# Patient Record
Sex: MALE | Race: BLACK OR AFRICAN AMERICAN | Employment: FULL TIME | URBAN - METROPOLITAN AREA
[De-identification: names, ages, dates, MRNs, and addresses within clinical notes are randomized per-mention and may not be internally consistent; named-entity substitution may affect disease eponyms.]

---

## 2021-05-16 ENCOUNTER — HOSPITAL ENCOUNTER (EMERGENCY)
Age: 46
Discharge: HOME | End: 2021-05-16
Attending: STUDENT IN AN ORGANIZED HEALTH CARE EDUCATION/TRAINING PROGRAM
Payer: COMMERCIAL

## 2021-05-16 VITALS
WEIGHT: 240 LBS | HEART RATE: 97 BPM | BODY MASS INDEX: 31.81 KG/M2 | TEMPERATURE: 97.8 F | SYSTOLIC BLOOD PRESSURE: 148 MMHG | RESPIRATION RATE: 18 BRPM | DIASTOLIC BLOOD PRESSURE: 98 MMHG | HEIGHT: 73 IN | OXYGEN SATURATION: 96 %

## 2021-05-16 DIAGNOSIS — Z23 NEED FOR TDAP VACCINATION: ICD-10-CM

## 2021-05-16 DIAGNOSIS — S01.01XA LACERATION OF SCALP, INITIAL ENCOUNTER: ICD-10-CM

## 2021-05-16 DIAGNOSIS — S09.90XA CLOSED HEAD INJURY, INITIAL ENCOUNTER: Primary | ICD-10-CM

## 2021-05-16 PROCEDURE — 90471 IMMUNIZATION ADMIN: CPT | Performed by: PHYSICIAN ASSISTANT

## 2021-05-16 PROCEDURE — 99283 EMERGENCY DEPT VISIT LOW MDM: CPT

## 2021-05-16 PROCEDURE — 6360000002 HC RX W HCPCS: Performed by: PHYSICIAN ASSISTANT

## 2021-05-16 PROCEDURE — 90715 TDAP VACCINE 7 YRS/> IM: CPT | Performed by: PHYSICIAN ASSISTANT

## 2021-05-16 PROCEDURE — 12001 RPR S/N/AX/GEN/TRNK 2.5CM/<: CPT

## 2021-05-16 RX ORDER — BACITRACIN ZINC AND POLYMYXIN B SULFATE 500; 1000 [USP'U]/G; [USP'U]/G
OINTMENT TOPICAL
Qty: 1 TUBE | Refills: 1 | Status: SHIPPED | OUTPATIENT
Start: 2021-05-16 | End: 2021-05-16 | Stop reason: SDUPTHER

## 2021-05-16 RX ORDER — BACITRACIN ZINC AND POLYMYXIN B SULFATE 500; 1000 [USP'U]/G; [USP'U]/G
OINTMENT TOPICAL
Qty: 1 TUBE | Refills: 1 | Status: SHIPPED | OUTPATIENT
Start: 2021-05-16 | End: 2021-05-23

## 2021-05-16 RX ADMIN — TETANUS TOXOID, REDUCED DIPHTHERIA TOXOID AND ACELLULAR PERTUSSIS VACCINE, ADSORBED 0.5 ML: 5; 2.5; 8; 8; 2.5 SUSPENSION INTRAMUSCULAR at 20:16

## 2021-05-16 ASSESSMENT — PAIN DESCRIPTION - FREQUENCY: FREQUENCY: CONTINUOUS

## 2021-05-16 ASSESSMENT — PAIN DESCRIPTION - PAIN TYPE: TYPE: ACUTE PAIN

## 2021-05-16 ASSESSMENT — PAIN DESCRIPTION - DESCRIPTORS: DESCRIPTORS: DISCOMFORT;DULL;THROBBING

## 2021-05-16 ASSESSMENT — PAIN DESCRIPTION - LOCATION: LOCATION: HEAD

## 2021-05-16 ASSESSMENT — PAIN SCALES - GENERAL: PAINLEVEL_OUTOF10: 3

## 2021-05-17 NOTE — ED NOTES
Discharged   To follow with pmd.  rx x 1 escribed to [facility]-   Also given  [facility]  (RT 46/pedro luis)     Grant Ramirez RN  05/16/21 2034
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
## 2021-05-25 ENCOUNTER — HOSPITAL ENCOUNTER (EMERGENCY)
Age: 46
Discharge: HOME | End: 2021-05-25
Attending: EMERGENCY MEDICINE
Payer: COMMERCIAL

## 2021-05-25 VITALS
RESPIRATION RATE: 16 BRPM | BODY MASS INDEX: 31.81 KG/M2 | SYSTOLIC BLOOD PRESSURE: 125 MMHG | HEART RATE: 86 BPM | TEMPERATURE: 97.8 F | HEIGHT: 73 IN | WEIGHT: 240 LBS | DIASTOLIC BLOOD PRESSURE: 70 MMHG | OXYGEN SATURATION: 97 %

## 2021-05-25 DIAGNOSIS — Z48.02 ENCOUNTER FOR STAPLE REMOVAL: Primary | ICD-10-CM

## 2021-05-25 PROCEDURE — 99282 EMERGENCY DEPT VISIT SF MDM: CPT

## 2021-05-25 NOTE — ED PROVIDER NOTES
HPI:  5/25/21, Time: 11:30 AM EDT         Stacey Hodgson is a 51 y.o. male presenting to the ED for staple removal today. 5 staples were placed in scalp last Sunday (9 days ago). Wound has healed. No pain. The complaint has been persistent, moderate in severity, and worsened by nothing. Patient denies any complaints. ROS:   A complete review of systems was performed and all pertinent positives and negatives are stated within HPI, all other systems reviewed and are negative.      --------------------------------------------- PAST HISTORY ---------------------------------------------  Past Medical History:  has no past medical history on file. Past Surgical History:  has no past surgical history on file. Social History:  reports that he has been smoking cigarettes. He has been smoking about 1.00 pack per day. He has never used smokeless tobacco. He reports current alcohol use. Family History: family history is not on file. The patients home medications have been reviewed. Allergies: Phenobarbital and Rondec-d [chlophedianol-pseudoephedrine]        ----------------------------------------PHYSICAL EXAM--------------------------------------  Constitutional:  Well developed, well nourished, no acute distress, non-toxic appearance   Eyes:  PERRL, conjunctiva normal, EOMI  HENT:  Atraumatic, healed laceration on top of scalp with 5 staples in place. external ears normal, nose normal, oropharynx moist. Neck- normal range of motion, no nuchal rigidity   Respiratory:  No respiratory distress  Cardiovascular:  Normal rate, normal rhythm. Radial pulses 2+ bilaterally. Musculoskeletal:  No edema, no tenderness, no deformities. Back- no tenderness  Integument:  Well hydrated, no visible rash. Adequate perfusion. Neurologic:  Alert & oriented x 3, CN 2-12 normal, no focal deficits noted. Normal gait.     Psychiatric:  Speech and behavior appropriate -------------------------------------------------- RESULTS -------------------------------------------------  I have personally reviewed all laboratory and imaging results for this patient. Results are listed below. LABS:  No results found for this visit on 05/25/21. RADIOLOGY:  Interpreted by Radiologist.  No orders to display     ------------------------- NURSING NOTES AND VITALS REVIEWED ---------------------------  The nursing notes within the ED encounter and vital signs as below have been reviewed by myself. /70   Pulse 86   Temp 97.8 °F (36.6 °C) (Infrared)   Resp 16   Ht 6' 1\" (1.854 m)   Wt 240 lb (108.9 kg)   SpO2 97%   BMI 31.66 kg/m²   Oxygen Saturation Interpretation: Normal      The patients available past medical records and past encounters were reviewed. ------------------------------ ED COURSE/MEDICAL DECISION MAKING----------------------  Medications - No data to display        Procedures:   PROCEDURE NOTE  5/25/21       Time: 11:30 AM EDT  SUTURE/STAPLE REMOVAL  Risks, benefits and alternatives (for applicable procedures below) described. Performed By: Rosa Gilmore DO. Indication:  Wound healed. Informed consent obtained: The patient provided verbal consent for this procedure. .  Location: scalp  Procedure:  5 staples were removed without difficulty. Complications:  None. Wound care instructions provided. Patient/guardian was instructed to be alert for any signs of cutaneous infection. Lexy Thomson DO am the Primary Provider of Record    Counseling: The emergency provider has spoken with the patient and discussed todays results, in addition to providing specific details for the plan of care and counseling regarding the diagnosis and prognosis. Questions are answered at this time and they are agreeable with the plan.    --------------------------- IMPRESSION AND DISPOSITION ---------------------------------    IMPRESSION  1.  Encounter

## 2023-08-24 ENCOUNTER — HOSPITAL ENCOUNTER (OUTPATIENT)
Age: 48
Discharge: HOME | End: 2023-08-26

## 2023-09-01 LAB — SURGICAL PATHOLOGY REPORT: NORMAL

## 2023-11-01 ENCOUNTER — HOSPITAL ENCOUNTER (OUTPATIENT)
Age: 48
Discharge: HOME | End: 2023-11-03

## 2023-11-01 LAB
ABO + RH BLD: NORMAL
ARM BAND NUMBER: NORMAL
BLOOD BANK SAMPLE EXPIRATION: NORMAL
BLOOD GROUP ANTIBODIES SERPL: NEGATIVE

## 2023-11-01 PROCEDURE — 87081 CULTURE SCREEN ONLY: CPT

## 2023-11-01 PROCEDURE — 86850 RBC ANTIBODY SCREEN: CPT

## 2023-11-01 PROCEDURE — 86901 BLOOD TYPING SEROLOGIC RH(D): CPT

## 2023-11-01 PROCEDURE — 86900 BLOOD TYPING SEROLOGIC ABO: CPT

## 2023-11-03 LAB
MICROORGANISM SPEC CULT: NORMAL
SPECIMEN DESCRIPTION: NORMAL

## 2023-11-09 ENCOUNTER — HOSPITAL ENCOUNTER (OUTPATIENT)
Age: 48
Discharge: HOME | End: 2023-11-11

## 2023-11-09 PROCEDURE — 87086 URINE CULTURE/COLONY COUNT: CPT

## 2023-11-10 ENCOUNTER — HOSPITAL ENCOUNTER (OUTPATIENT)
Age: 48
Discharge: HOME | End: 2023-11-10
Payer: COMMERCIAL

## 2023-11-10 LAB
ANION GAP SERPL CALCULATED.3IONS-SCNC: 8 MMOL/L (ref 7–16)
BUN SERPL-MCNC: 7 MG/DL (ref 6–20)
CALCIUM SERPL-MCNC: 8.4 MG/DL (ref 8.6–10.2)
CHLORIDE SERPL-SCNC: 103 MMOL/L (ref 98–107)
CO2 SERPL-SCNC: 24 MMOL/L (ref 22–29)
CREAT SERPL-MCNC: 0.9 MG/DL (ref 0.7–1.2)
ERYTHROCYTE [DISTWIDTH] IN BLOOD BY AUTOMATED COUNT: 13.6 % (ref 11.5–15)
GFR SERPL CREATININE-BSD FRML MDRD: >60 ML/MIN/1.73M2
GLUCOSE SERPL-MCNC: 135 MG/DL (ref 74–99)
HCT VFR BLD AUTO: 40.6 % (ref 37–54)
HGB BLD-MCNC: 13.4 G/DL (ref 12.5–16.5)
MCH RBC QN AUTO: 30.6 PG (ref 26–35)
MCHC RBC AUTO-ENTMCNC: 33 G/DL (ref 32–34.5)
MCV RBC AUTO: 92.7 FL (ref 80–99.9)
MICROORGANISM SPEC CULT: NO GROWTH
PLATELET, FLUORESCENCE: 188 K/UL (ref 130–450)
PMV BLD AUTO: 12.9 FL (ref 7–12)
POTASSIUM SERPL-SCNC: 4 MMOL/L (ref 3.5–5)
RBC # BLD AUTO: 4.38 M/UL (ref 3.8–5.8)
SODIUM SERPL-SCNC: 135 MMOL/L (ref 132–146)
SPECIMEN DESCRIPTION: NORMAL
WBC OTHER # BLD: 15.3 K/UL (ref 4.5–11.5)

## 2023-11-10 PROCEDURE — 80048 BASIC METABOLIC PNL TOTAL CA: CPT

## 2023-11-10 PROCEDURE — 85027 COMPLETE CBC AUTOMATED: CPT

## 2023-11-11 LAB
ANION GAP SERPL CALCULATED.3IONS-SCNC: 12 MMOL/L (ref 7–16)
BUN SERPL-MCNC: 5 MG/DL (ref 6–20)
CALCIUM SERPL-MCNC: 8.7 MG/DL (ref 8.6–10.2)
CHLORIDE SERPL-SCNC: 103 MMOL/L (ref 98–107)
CO2 SERPL-SCNC: 27 MMOL/L (ref 22–29)
CREAT SERPL-MCNC: 1 MG/DL (ref 0.7–1.2)
ERYTHROCYTE [DISTWIDTH] IN BLOOD BY AUTOMATED COUNT: 14 % (ref 11.5–15)
GFR SERPL CREATININE-BSD FRML MDRD: >60 ML/MIN/1.73M2
GLUCOSE SERPL-MCNC: 102 MG/DL (ref 74–99)
HCT VFR BLD AUTO: 40.9 % (ref 37–54)
HGB BLD-MCNC: 13 G/DL (ref 12.5–16.5)
MCH RBC QN AUTO: 30.1 PG (ref 26–35)
MCHC RBC AUTO-ENTMCNC: 31.8 G/DL (ref 32–34.5)
MCV RBC AUTO: 94.7 FL (ref 80–99.9)
PLATELET # BLD AUTO: 221 K/UL (ref 130–450)
PMV BLD AUTO: 12.6 FL (ref 7–12)
POTASSIUM SERPL-SCNC: 3.8 MMOL/L (ref 3.5–5)
RBC # BLD AUTO: 4.32 M/UL (ref 3.8–5.8)
SODIUM SERPL-SCNC: 142 MMOL/L (ref 132–146)
WBC OTHER # BLD: 12.5 K/UL (ref 4.5–11.5)

## 2023-11-11 PROCEDURE — 80048 BASIC METABOLIC PNL TOTAL CA: CPT

## 2023-11-11 PROCEDURE — 85027 COMPLETE CBC AUTOMATED: CPT

## 2023-11-12 ENCOUNTER — HOSPITAL ENCOUNTER (OUTPATIENT)
Age: 48
Discharge: HOME | End: 2023-11-14

## 2023-11-12 LAB
ANION GAP SERPL CALCULATED.3IONS-SCNC: 10 MMOL/L (ref 7–16)
BUN SERPL-MCNC: 7 MG/DL (ref 6–20)
CALCIUM SERPL-MCNC: 8.4 MG/DL (ref 8.6–10.2)
CHLORIDE SERPL-SCNC: 101 MMOL/L (ref 98–107)
CO2 SERPL-SCNC: 27 MMOL/L (ref 22–29)
CREAT SERPL-MCNC: 0.8 MG/DL (ref 0.7–1.2)
ERYTHROCYTE [DISTWIDTH] IN BLOOD BY AUTOMATED COUNT: 13.9 % (ref 11.5–15)
GFR SERPL CREATININE-BSD FRML MDRD: >60 ML/MIN/1.73M2
GLUCOSE SERPL-MCNC: 89 MG/DL (ref 74–99)
HCT VFR BLD AUTO: 36.9 % (ref 37–54)
HGB BLD-MCNC: 11.9 G/DL (ref 12.5–16.5)
MCH RBC QN AUTO: 30.7 PG (ref 26–35)
MCHC RBC AUTO-ENTMCNC: 32.2 G/DL (ref 32–34.5)
MCV RBC AUTO: 95.3 FL (ref 80–99.9)
PLATELET # BLD AUTO: 196 K/UL (ref 130–450)
PMV BLD AUTO: 12.6 FL (ref 7–12)
POTASSIUM SERPL-SCNC: 3.6 MMOL/L (ref 3.5–5)
RBC # BLD AUTO: 3.87 M/UL (ref 3.8–5.8)
SODIUM SERPL-SCNC: 138 MMOL/L (ref 132–146)
WBC OTHER # BLD: 12.3 K/UL (ref 4.5–11.5)

## 2023-11-12 PROCEDURE — 80048 BASIC METABOLIC PNL TOTAL CA: CPT

## 2023-11-12 PROCEDURE — 85027 COMPLETE CBC AUTOMATED: CPT

## 2023-11-13 ENCOUNTER — HOSPITAL ENCOUNTER (OUTPATIENT)
Age: 48
Discharge: HOME | End: 2023-11-15
Payer: COMMERCIAL

## 2023-11-13 LAB
ANION GAP SERPL CALCULATED.3IONS-SCNC: 8 MMOL/L (ref 7–16)
BUN SERPL-MCNC: 10 MG/DL (ref 6–20)
CALCIUM SERPL-MCNC: 8.7 MG/DL (ref 8.6–10.2)
CHLORIDE SERPL-SCNC: 103 MMOL/L (ref 98–107)
CO2 SERPL-SCNC: 29 MMOL/L (ref 22–29)
CREAT SERPL-MCNC: 0.9 MG/DL (ref 0.7–1.2)
ERYTHROCYTE [DISTWIDTH] IN BLOOD BY AUTOMATED COUNT: 13.5 % (ref 11.5–15)
GFR SERPL CREATININE-BSD FRML MDRD: >60 ML/MIN/1.73M2
GLUCOSE SERPL-MCNC: 114 MG/DL (ref 74–99)
HCT VFR BLD AUTO: 37.4 % (ref 37–54)
HGB BLD-MCNC: 12 G/DL (ref 12.5–16.5)
MCH RBC QN AUTO: 30.5 PG (ref 26–35)
MCHC RBC AUTO-ENTMCNC: 32.1 G/DL (ref 32–34.5)
MCV RBC AUTO: 95.2 FL (ref 80–99.9)
PLATELET # BLD AUTO: 224 K/UL (ref 130–450)
PMV BLD AUTO: 11.9 FL (ref 7–12)
POTASSIUM SERPL-SCNC: 3.6 MMOL/L (ref 3.5–5)
RBC # BLD AUTO: 3.93 M/UL (ref 3.8–5.8)
SODIUM SERPL-SCNC: 140 MMOL/L (ref 132–146)
WBC OTHER # BLD: 10.6 K/UL (ref 4.5–11.5)

## 2023-11-13 PROCEDURE — 80048 BASIC METABOLIC PNL TOTAL CA: CPT

## 2023-11-13 PROCEDURE — 85027 COMPLETE CBC AUTOMATED: CPT

## 2023-11-14 LAB — SURGICAL PATHOLOGY REPORT: NORMAL

## 2023-11-24 LAB — SURGICAL PATHOLOGY REPORT: NORMAL

## 2023-12-20 ENCOUNTER — OFFICE VISIT (OUTPATIENT)
Dept: FAMILY MEDICINE CLINIC | Age: 48
End: 2023-12-20
Payer: COMMERCIAL

## 2023-12-20 VITALS
TEMPERATURE: 97.8 F | WEIGHT: 174 LBS | DIASTOLIC BLOOD PRESSURE: 66 MMHG | OXYGEN SATURATION: 95 % | HEART RATE: 78 BPM | HEIGHT: 73 IN | SYSTOLIC BLOOD PRESSURE: 102 MMHG | BODY MASS INDEX: 23.06 KG/M2

## 2023-12-20 DIAGNOSIS — Z13.220 SCREENING, LIPID: ICD-10-CM

## 2023-12-20 DIAGNOSIS — C18.9 MALIGNANT NEOPLASM OF COLON, UNSPECIFIED PART OF COLON (HCC): Primary | ICD-10-CM

## 2023-12-20 DIAGNOSIS — Z11.4 SCREENING FOR HIV (HUMAN IMMUNODEFICIENCY VIRUS): ICD-10-CM

## 2023-12-20 DIAGNOSIS — Z11.59 ENCOUNTER FOR HEPATITIS C SCREENING TEST FOR LOW RISK PATIENT: ICD-10-CM

## 2023-12-20 PROCEDURE — G8427 DOCREV CUR MEDS BY ELIG CLIN: HCPCS | Performed by: FAMILY MEDICINE

## 2023-12-20 PROCEDURE — G8420 CALC BMI NORM PARAMETERS: HCPCS | Performed by: FAMILY MEDICINE

## 2023-12-20 PROCEDURE — 99203 OFFICE O/P NEW LOW 30 MIN: CPT | Performed by: FAMILY MEDICINE

## 2023-12-20 PROCEDURE — G8484 FLU IMMUNIZE NO ADMIN: HCPCS | Performed by: FAMILY MEDICINE

## 2023-12-20 PROCEDURE — 1036F TOBACCO NON-USER: CPT | Performed by: FAMILY MEDICINE

## 2023-12-28 ENCOUNTER — HOSPITAL ENCOUNTER (OUTPATIENT)
Age: 48
Discharge: HOME | End: 2023-12-28
Payer: COMMERCIAL

## 2023-12-28 DIAGNOSIS — Z11.4 SCREENING FOR HIV (HUMAN IMMUNODEFICIENCY VIRUS): ICD-10-CM

## 2023-12-28 DIAGNOSIS — Z11.59 ENCOUNTER FOR HEPATITIS C SCREENING TEST FOR LOW RISK PATIENT: ICD-10-CM

## 2023-12-28 DIAGNOSIS — Z13.220 SCREENING, LIPID: ICD-10-CM

## 2023-12-28 LAB
CHOLEST SERPL-MCNC: 199 MG/DL
HDLC SERPL-MCNC: 78 MG/DL
LDLC SERPL CALC-MCNC: 106 MG/DL
TRIGL SERPL-MCNC: 75 MG/DL
VLDLC SERPL CALC-MCNC: 15 MG/DL

## 2023-12-28 PROCEDURE — 87389 HIV-1 AG W/HIV-1&-2 AB AG IA: CPT

## 2023-12-28 PROCEDURE — 36415 COLL VENOUS BLD VENIPUNCTURE: CPT

## 2023-12-28 PROCEDURE — 86803 HEPATITIS C AB TEST: CPT

## 2023-12-28 PROCEDURE — 80061 LIPID PANEL: CPT

## 2023-12-29 LAB
HCV AB SERPL QL IA: NONREACTIVE
HIV 1+2 AB+HIV1 P24 AG SERPL QL IA: NONREACTIVE

## 2024-01-01 ASSESSMENT — ENCOUNTER SYMPTOMS
SHORTNESS OF BREATH: 0
GASTROINTESTINAL NEGATIVE: 1
SINUS PAIN: 0
RHINORRHEA: 0
EYE REDNESS: 0
PHOTOPHOBIA: 0
COUGH: 0
EYE DISCHARGE: 0

## 2024-01-01 NOTE — PROGRESS NOTES
this visit.       ALLERGIES  Allergies   Allergen Reactions    Phenobarbital Other (See Comments)    Rondec-D [Chlophedianol-Pseudoephedrine] Other (See Comments)       PHYSICAL EXAM    /66   Pulse 78   Temp 97.8 °F (36.6 °C)   Ht 1.854 m (6' 1\")   Wt 78.9 kg (174 lb)   SpO2 95%   BMI 22.96 kg/m²     Physical Exam  Vitals and nursing note reviewed.   Constitutional:       Appearance: Normal appearance. He is normal weight.   HENT:      Nose: No congestion or rhinorrhea.      Mouth/Throat:      Mouth: Mucous membranes are moist.      Pharynx: Oropharynx is clear.   Eyes:      General: No scleral icterus.     Conjunctiva/sclera: Conjunctivae normal.   Cardiovascular:      Rate and Rhythm: Normal rate and regular rhythm.      Heart sounds: Normal heart sounds.   Pulmonary:      Effort: Pulmonary effort is normal.      Breath sounds: Normal breath sounds.   Abdominal:      Palpations: Abdomen is soft.      Tenderness: There is no abdominal tenderness.   Musculoskeletal:      Cervical back: Neck supple.   Skin:     General: Skin is warm.   Neurological:      Mental Status: He is alert and oriented to person, place, and time.   Psychiatric:         Mood and Affect: Mood normal.         ASSESSMENT & PLAN    Josue was seen today for new patient.    Diagnoses and all orders for this visit:    Malignant neoplasm of colon, unspecified part of colon (HCC)    Screening for HIV (human immunodeficiency virus)  -     HIV Screen; Future    Encounter for hepatitis C screening test for low risk patient  -     Hepatitis C Antibody; Future    Screening, lipid  -     Lipid Panel; Future    Patient will continue to follow at [facility] for treatment of his colon cancer.  Labs have been changed for screening for HIV and hepatitis C and lipids.  Patient will follow-up in 3 months.  Records will be requested from [facility]    Return in about 3 months (around 3/20/2024).         Electronically signed by Pedrito Swan DO on

## 2024-11-21 ENCOUNTER — HOSPITAL ENCOUNTER (OUTPATIENT)
Age: 49
Discharge: HOME | End: 2024-11-23

## 2024-11-27 LAB — SURGICAL PATHOLOGY REPORT: NORMAL

## 2025-01-22 ENCOUNTER — OFFICE VISIT (OUTPATIENT)
Dept: FAMILY MEDICINE CLINIC | Age: 50
End: 2025-01-22
Payer: COMMERCIAL

## 2025-01-22 VITALS
HEIGHT: 68 IN | BODY MASS INDEX: 30.99 KG/M2 | SYSTOLIC BLOOD PRESSURE: 105 MMHG | HEART RATE: 63 BPM | TEMPERATURE: 98.6 F | DIASTOLIC BLOOD PRESSURE: 66 MMHG | WEIGHT: 204.5 LBS

## 2025-01-22 DIAGNOSIS — M54.2 NECK PAIN: ICD-10-CM

## 2025-01-22 DIAGNOSIS — R20.2 PARESTHESIAS: Primary | ICD-10-CM

## 2025-01-22 PROCEDURE — 3017F COLORECTAL CA SCREEN DOC REV: CPT | Performed by: FAMILY MEDICINE

## 2025-01-22 PROCEDURE — G2211 COMPLEX E/M VISIT ADD ON: HCPCS | Performed by: FAMILY MEDICINE

## 2025-01-22 PROCEDURE — G8427 DOCREV CUR MEDS BY ELIG CLIN: HCPCS | Performed by: FAMILY MEDICINE

## 2025-01-22 PROCEDURE — 99214 OFFICE O/P EST MOD 30 MIN: CPT | Performed by: FAMILY MEDICINE

## 2025-01-22 PROCEDURE — G8417 CALC BMI ABV UP PARAM F/U: HCPCS | Performed by: FAMILY MEDICINE

## 2025-01-22 PROCEDURE — 1036F TOBACCO NON-USER: CPT | Performed by: FAMILY MEDICINE

## 2025-01-22 SDOH — ECONOMIC STABILITY: FOOD INSECURITY: WITHIN THE PAST 12 MONTHS, THE FOOD YOU BOUGHT JUST DIDN'T LAST AND YOU DIDN'T HAVE MONEY TO GET MORE.: NEVER TRUE

## 2025-01-22 SDOH — ECONOMIC STABILITY: FOOD INSECURITY: WITHIN THE PAST 12 MONTHS, YOU WORRIED THAT YOUR FOOD WOULD RUN OUT BEFORE YOU GOT MONEY TO BUY MORE.: NEVER TRUE

## 2025-01-22 ASSESSMENT — PATIENT HEALTH QUESTIONNAIRE - PHQ9
10. IF YOU CHECKED OFF ANY PROBLEMS, HOW DIFFICULT HAVE THESE PROBLEMS MADE IT FOR YOU TO DO YOUR WORK, TAKE CARE OF THINGS AT HOME, OR GET ALONG WITH OTHER PEOPLE: SOMEWHAT DIFFICULT
4. FEELING TIRED OR HAVING LITTLE ENERGY: SEVERAL DAYS
SUM OF ALL RESPONSES TO PHQ QUESTIONS 1-9: 8
8. MOVING OR SPEAKING SO SLOWLY THAT OTHER PEOPLE COULD HAVE NOTICED. OR THE OPPOSITE, BEING SO FIGETY OR RESTLESS THAT YOU HAVE BEEN MOVING AROUND A LOT MORE THAN USUAL: SEVERAL DAYS
SUM OF ALL RESPONSES TO PHQ QUESTIONS 1-9: 8
5. POOR APPETITE OR OVEREATING: SEVERAL DAYS
7. TROUBLE CONCENTRATING ON THINGS, SUCH AS READING THE NEWSPAPER OR WATCHING TELEVISION: SEVERAL DAYS
2. FEELING DOWN, DEPRESSED OR HOPELESS: NOT AT ALL
1. LITTLE INTEREST OR PLEASURE IN DOING THINGS: NEARLY EVERY DAY
6. FEELING BAD ABOUT YOURSELF - OR THAT YOU ARE A FAILURE OR HAVE LET YOURSELF OR YOUR FAMILY DOWN: NOT AT ALL
SUM OF ALL RESPONSES TO PHQ9 QUESTIONS 1 & 2: 3
3. TROUBLE FALLING OR STAYING ASLEEP: SEVERAL DAYS
9. THOUGHTS THAT YOU WOULD BE BETTER OFF DEAD, OR OF HURTING YOURSELF: NOT AT ALL
SUM OF ALL RESPONSES TO PHQ QUESTIONS 1-9: 8
SUM OF ALL RESPONSES TO PHQ QUESTIONS 1-9: 8

## 2025-01-22 NOTE — PROGRESS NOTES
2025    Josue Dyson    Chief Complaint   Patient presents with    Follow-up     Pt says that his ramsey go to sleep while he's asleep.        HPI  History was obtained from patients.  Josue is a 49 y.o. male who presents today with the followin. Paresthesias    2. Neck pain    Patient states that he has had paresthesias in both hands for the last 6 weeks.  Patient notices especially at night.  He has had no injuries.  Patient has had neck pain and no neck injuries.    REVIEW OF SYMPTOMS    Review of Systems   Constitutional:  Negative for chills, fatigue and fever.   HENT:  Negative for congestion, mouth sores, postnasal drip, rhinorrhea and sinus pain.    Eyes:  Negative for photophobia, discharge and redness.   Respiratory:  Negative for cough and shortness of breath.    Cardiovascular:  Negative for chest pain.   Gastrointestinal: Negative.    Genitourinary:  Negative for difficulty urinating, dysuria, hematuria and urgency.   Neurological:  Positive for numbness (Bilateral hands). Negative for headaches.   Psychiatric/Behavioral:  Negative for sleep disturbance.        PAST MEDICAL HISTORY  History reviewed. No pertinent past medical history.    FAMILY HISTORY  History reviewed. No pertinent family history.    SOCIAL HISTORY  Social History     Socioeconomic History    Marital status: Single     Spouse name: None    Number of children: None    Years of education: None    Highest education level: None   Tobacco Use    Smoking status: Former     Types: Cigarettes    Smokeless tobacco: Never   Substance and Sexual Activity    Alcohol use: Yes     Comment: occ     Social Determinants of Health     Food Insecurity: No Food Insecurity (2025)    Hunger Vital Sign     Worried About Running Out of Food in the Last Year: Never true     Ran Out of Food in the Last Year: Never true   Transportation Needs: No Transportation Needs (2025)    PRAPARE - Transportation     Lack of Transportation (Medical): No

## 2025-02-04 ASSESSMENT — ENCOUNTER SYMPTOMS
COUGH: 0
SINUS PAIN: 0
PHOTOPHOBIA: 0
GASTROINTESTINAL NEGATIVE: 1
EYE DISCHARGE: 0
RHINORRHEA: 0
EYE REDNESS: 0
SHORTNESS OF BREATH: 0

## 2025-02-13 ENCOUNTER — PROCEDURE VISIT (OUTPATIENT)
Dept: PHYSICAL MEDICINE AND REHAB | Age: 50
End: 2025-02-13

## 2025-02-13 VITALS — BODY MASS INDEX: 29.86 KG/M2 | HEIGHT: 68 IN | WEIGHT: 197 LBS

## 2025-02-13 DIAGNOSIS — R20.2 PARESTHESIA: Primary | ICD-10-CM

## 2025-02-13 DIAGNOSIS — R20.2 PARESTHESIAS: ICD-10-CM

## 2025-02-13 NOTE — PROGRESS NOTES
Electrodiagnostic Laboratory  *Accredited by the [facility] with exemplary status  [address] [place] [address]  Phone: [phone]  Fax: [phone]      Date of Examination: 02/13/25    Patient Name: Josue Dyson    An independent historian was not needed.     Josue Dyson  is a 49 y.o. year old male who was seen today regarding   Chief Complaint   Patient presents with    Extremity Pain     Pain in the middle and ring finger and the middle two toes. Right side is worse. Since chemo in May.     Numbness     Tingling in the fingers and toes.    Extremity Weakness     None   The symptoms are intermittent.       I have reviewed the referring provider's office note.    There is not a family history of neuromuscular conditions.     Physical Exam: General: The patient is in no apparent distress.  MSK: There is no joint effusion, deformity, instability, swelling, erythema or warmth.  AROM is full in the spine and extremities. +Tinel bilateral wrists. Negative Spurling. Negative SLR. Neurologic:  No focal sensorimotor deficit.  Reflexes 2+ and symmetric. Gait is normal.    Impression:     1. Paresthesia [R20.2]        Plan:   EMG is indicated to evaluate the above diagnosis.    EMG was done today and showed bilateral median mononeuropathy at the wrists clinically consistent with carpal tunnel syndrome.   Recommend neutral wrist splints at h.s., OT and/or carpal tunnel injection and if no improvement after 4-6 weeks of conservative treatments consider orthopedic surgery evaluation.  Recommend repeating the EMG in 1 year if symptoms persist.    The examined right lower extremity was normal.  EMG cannot rule out a small fiber neuropathy. Specifically there is no electrodiagnostic evidence of a peripheral polyneuropathy, right L2 through S1 radiculopathy, plexopathy, sciatic, tibial or peroneal neuropathy. EMG is a very sensitive test for the presence of and approximate location of radiculopathy but 
plexopathy, sciatic, tibial or peroneal neuropathy. EMG is a very sensitive test for the presence of and approximate location of radiculopathy but can be completely normal in the first 10-14 days after symptom onset, in a purely demyelinating lesion and in sensory radiculopathy where the sensory nerve root is predominantly affected. Paraspinal muscles can be spared in radiculopathy and the abnormality of paraspinals is useful in identifying a radiculopathy but not in localizing the segmental level. Spinal stenosis defined by intermittent compression of nerve roots causing claudication and in mild to moderate cases seldom causes abnormality on EMG.     Previous Study: There is not a prior study for comparison.    Follow up EMG is recommended if no surgical intervention and symptoms persist in one year.     Technologist: Robyn Sullivan  Physician:    Lizbeth Díaz D.O., P.T.  Board Certified Physical Medicine and Rehabilitation  Board Certified Electrodiagnostic Medicine      Nerve conduction studies and electromyography were performed according to our laboratory policies and procedures which can be provided upon request. All abnormal values are identified in the table. Laboratory normal values can also be provided upon request.       Cc: Pedrito Swan DO Maxwell, Gerald S DO

## 2025-02-13 NOTE — PATIENT INSTRUCTIONS
Electrodiagnotic Laboratory  [facility] the [facility]  MARLENE Handley D.O.   [facility]  [address] [place] [address]  Phone: [phone]  Fax: [phone]        Today you had an electrodiagnostic exam which included nerve conduction studies (NCS) and electromyography (EMG). This test evaluated the electrical activity of your nerves and muscles to help determine if you have a nerve or muscle disease.  This test can help determine the location and type of a nerve or muscle problem. This will help your referring doctor diagnose your condition and determine the appropriate next step in your treatment plan.     After your test:    1. There are no long lasting side effects of the test.     2. You may resume your normal activities without restrictions.     3.  Resume any medications that were stopped for the test.     4  If you have sore areas or bruising in your muscles where the needle was placed, apply a cold pack to the sore area for 15-20 minutes three to four times a day as needed for pain.  The soreness should go away in about 1-2 days.     5. Your results were provided  Briefly at the end of your test and the final detailed report will be provided to your referring physician, and/or primary care physician and any other parties you requested within 1-2 days of the examination. You may wish to contact your referring provider after a few days to determine what they would like you to do next.     6.  Please call [phone] with any questions or concerns and if you develop increased body temperature/fever, swelling, tenderness, increased pain and/or drainage from the sites where the needle was placed.     Thank you for choosing us for your health care needs.

## 2025-02-26 ENCOUNTER — OFFICE VISIT (OUTPATIENT)
Dept: FAMILY MEDICINE CLINIC | Age: 50
End: 2025-02-26

## 2025-02-26 VITALS
SYSTOLIC BLOOD PRESSURE: 123 MMHG | WEIGHT: 195 LBS | BODY MASS INDEX: 29.65 KG/M2 | HEART RATE: 65 BPM | TEMPERATURE: 97.8 F | DIASTOLIC BLOOD PRESSURE: 75 MMHG

## 2025-02-26 DIAGNOSIS — G56.03 BILATERAL CARPAL TUNNEL SYNDROME: Primary | ICD-10-CM

## 2025-02-26 DIAGNOSIS — M54.2 NECK PAIN: ICD-10-CM

## 2025-02-26 DIAGNOSIS — M54.12 LEFT CERVICAL RADICULOPATHY: ICD-10-CM

## 2025-02-26 ASSESSMENT — ENCOUNTER SYMPTOMS
EYE DISCHARGE: 0
GASTROINTESTINAL NEGATIVE: 1
COUGH: 0
PHOTOPHOBIA: 0
RHINORRHEA: 0
SHORTNESS OF BREATH: 0
SINUS PAIN: 0
EYE REDNESS: 0

## 2025-02-26 NOTE — PROGRESS NOTES
2025    Josue Dyson    Chief Complaint   Patient presents with    Follow-up       HPI  History was obtained from patient.  Josue is a 49 y.o. male who presents today with the followin. Bilateral carpal tunnel syndrome    2. Neck pain    3. Left cervical radiculopathy    Patient presents for follow-up of paresthesias of the bilateral hands and neck pain.  Patient recently had EMGs done that did show bilateral carpal tunnel syndrome.  Patient states he does continue to have numbness tingling and pain in both hands that does tend to keep him up at night.  EMG of the right leg showed no abnormalities.  Patient also complains of posterior neck pain with radiculopathy into the left arm.  Patient had recent x-ray of the cervical spine that showed only some mild degenerative changes.     REVIEW OF SYMPTOMS    Review of Systems   Constitutional:  Negative for chills, fatigue and fever.   HENT:  Negative for congestion, mouth sores, postnasal drip, rhinorrhea and sinus pain.    Eyes:  Negative for photophobia, discharge and redness.   Respiratory:  Negative for cough and shortness of breath.    Cardiovascular:  Negative for chest pain.   Gastrointestinal: Negative.    Genitourinary:  Negative for difficulty urinating, dysuria, hematuria and urgency.   Neurological:  Positive for numbness (Bilateral hands). Negative for headaches.   Psychiatric/Behavioral:  Negative for sleep disturbance.        PAST MEDICAL HISTORY  History reviewed. No pertinent past medical history.    FAMILY HISTORY  History reviewed. No pertinent family history.    SOCIAL HISTORY  Social History     Socioeconomic History    Marital status: Single     Spouse name: None    Number of children: None    Years of education: None    Highest education level: None   Tobacco Use    Smoking status: Former     Types: Cigarettes    Smokeless tobacco: Never   Substance and Sexual Activity    Alcohol use: Yes     Comment: occ     Social Determinants of Health

## 2025-03-12 ENCOUNTER — EVALUATION (OUTPATIENT)
Dept: OCCUPATIONAL THERAPY | Age: 50
End: 2025-03-12

## 2025-03-12 DIAGNOSIS — G56.03 CARPAL TUNNEL SYNDROME, BILATERAL: Primary | ICD-10-CM

## 2025-03-12 NOTE — PROGRESS NOTES
OCCUPATIONAL THERAPY INITIAL EVALUATION    [facility] PHYSICIANS [facility] OCCUPATIONAL THERAPY  [address]  [address]LO[address]  [facility] [address]  Dept: [phone]  Loc: [phone]   [facility] OT Fax: [phone]    Date:  3/12/2025  Initial Evaluation Date: 3/12/2025   Evaluating Therapist: Aileen Christy OT    Patient Name:  Josue Dyson    :  1975    Restrictions/Precautions:  follow appropriate protocol, low fall risk  Diagnosis:  G56.03 (ICD-10-CM) - Bilateral carpal tunnel syndrome        Date of Surgery/Injury: ongoing for a year    Insurance/Certification information:  Med [place]  Plan of care signed (Y/N): N  Visit# / total visits: -    Referring Practitioner:  Pedrito Swan DO   Specific Practitioner Orders: Eval and treat    Assessment of current deficits   [] Functional mobility  [x] ADLs  [x] Strength   [] Cognition   [] Functional transfers   [x] IADLs  [] Safety Awareness  [x] Endurance   [x] Fine Motor Coordination  [] Balance  [] Vision/perception  [] Sensation    [] Gross Motor Coordination [] ROM  [x] Pain   [x] Edema    [] Scar Adhesion/Skin Integrity     OT PLAN OF CARE   OT POC based on physician orders, patient diagnosis and results of clinical assessment    Frequency/Duration: 1-2x / week for 5-12 visits.   Certification period From: 3/12/2025 To: 2025  Specific OT Treatment to include:     [x] Instruction in HEP                   Modalities:  [x] Therapeutic Exercise        [x] Ultrasound               [x] Electrical Stimulation/Attended  [x] PROM/Stretching                    [x] Fluidotherapy          [x]  Paraffin                   [x] AAROM  [x] AROM                 [x] Iontophoresis:   [x] Tendon Glides                                               [] Neuromuscular Re-Ed            [x] ADL/IADL re-training    [x] Therapeutic Activity       [x] Pain Management with/without modalities PRN

## 2025-03-20 ENCOUNTER — HOSPITAL ENCOUNTER (OUTPATIENT)
Dept: GENERAL RADIOLOGY | Age: 50
Discharge: HOME | End: 2025-03-22
Payer: COMMERCIAL

## 2025-03-20 ENCOUNTER — HOSPITAL ENCOUNTER (OUTPATIENT)
Dept: MRI IMAGING | Age: 50
Discharge: HOME | End: 2025-03-22
Payer: COMMERCIAL

## 2025-03-20 DIAGNOSIS — M54.2 NECK PAIN: ICD-10-CM

## 2025-03-20 DIAGNOSIS — Z18.9 RETAINED FOREIGN BODY: ICD-10-CM

## 2025-03-20 DIAGNOSIS — M54.12 LEFT CERVICAL RADICULOPATHY: ICD-10-CM

## 2025-03-20 PROCEDURE — 70030 X-RAY EYE FOR FOREIGN BODY: CPT

## 2025-03-20 PROCEDURE — 74019 RADEX ABDOMEN 2 VIEWS: CPT

## 2025-03-20 PROCEDURE — 72141 MRI NECK SPINE W/O DYE: CPT

## 2025-03-26 ENCOUNTER — TREATMENT (OUTPATIENT)
Dept: OCCUPATIONAL THERAPY | Age: 50
End: 2025-03-26
Payer: COMMERCIAL

## 2025-03-26 DIAGNOSIS — G56.03 CARPAL TUNNEL SYNDROME, BILATERAL: Primary | ICD-10-CM

## 2025-03-26 PROCEDURE — 97110 THERAPEUTIC EXERCISES: CPT | Performed by: OCCUPATIONAL THERAPIST

## 2025-03-26 PROCEDURE — 97035 APP MDLTY 1+ULTRASOUND EA 15: CPT | Performed by: OCCUPATIONAL THERAPIST

## 2025-03-26 NOTE — PROGRESS NOTES
OCCUPATIONAL THERAPY DAILY NOTE  [facility] PHYSICIANS [facility] OCCUPATIONAL THERAPY  [address]  [address]LO[address]  [facility] [address]  Dept: [phone]  Loc: [phone]   [facility] OT Fax: [phone]      Date:  3/26/2025   Initial Evaluation Date: 3/12/2025                            Evaluating Therapist: Aileen Christy OT     Patient Name:  Josue Dyson                        :  1975     Restrictions/Precautions:  follow appropriate protocol, low fall risk  Diagnosis:  G56.03 (ICD-10-CM) - Bilateral carpal tunnel syndrome                                                            Date of Surgery/Injury: ongoing for a year     Insurance/Certification information:  Med [place]  Plan of care signed (Y/N): N  Visit# / total visits: -12     Referring Practitioner:  Pedrito Swan DO   Specific Practitioner Orders: Eval and treat    OT PLAN OF CARE   OT POC based on physician orders, patient diagnosis and results of clinical assessment     Frequency/Duration: 1-2x / week for 5-12 visits.   Certification period From: 3/12/2025 To: 2025    GOALS (Long term same as Short term):  1) Patient will demonstrate good understanding of home program (exercises/activities/diagnosis/prognosis/goals) with good accuracy.   2) Patient to report decreased pain in their affected R upper extremity from 8/10 to 2/10 or less with resistive functional use.   3) Patient to report 100% compliance with their splint wear, care, and precautions if needed.   4) Patient will report ADL functions as Mod I/I using BUE's.   5) Patient will demonstrate improved functional activity tolerance from fair- to good for ADL/IADL completion.  6) Patient will decrease QuickDASH score to 20% or less for increased participation in daily functional activities.     TODAY'S TREATMENT     Pain Level: 4 on scale of 1-10 at rest and 6/10 with certain motions, sharp and

## 2025-04-02 ENCOUNTER — TREATMENT (OUTPATIENT)
Dept: OCCUPATIONAL THERAPY | Age: 50
End: 2025-04-02
Payer: COMMERCIAL

## 2025-04-02 DIAGNOSIS — G56.03 CARPAL TUNNEL SYNDROME, BILATERAL: Primary | ICD-10-CM

## 2025-04-02 PROCEDURE — 97110 THERAPEUTIC EXERCISES: CPT | Performed by: OCCUPATIONAL THERAPIST

## 2025-04-02 PROCEDURE — 97140 MANUAL THERAPY 1/> REGIONS: CPT | Performed by: OCCUPATIONAL THERAPIST

## 2025-04-02 PROCEDURE — 97035 APP MDLTY 1+ULTRASOUND EA 15: CPT | Performed by: OCCUPATIONAL THERAPIST

## 2025-04-02 NOTE — PROGRESS NOTES
OCCUPATIONAL THERAPY DAILY NOTE  [facility] PHYSICIANS [place] [facility]ARE [facility] OCCUPATIONAL THERAPY  [address]  [address] FLO[address]  [facility] [address]  Dept: [phone]  Loc: [phone]   [facility] OT Fax: [phone]      Date:  2025   Initial Evaluation Date: 3/12/2025                            Evaluating Therapist: Aileen Christy OT     Patient Name:  Josue Dyson                        :  1975     Restrictions/Precautions:  follow appropriate protocol, low fall risk  Diagnosis:  G56.03 (ICD-10-CM) - Bilateral carpal tunnel syndrome                                                            Date of Surgery/Injury: ongoing for a year     Insurance/Certification information:  Med [place]  Plan of care signed (Y/N): N  Visit# / total visits: 3 / 5-     Referring Practitioner:  Pedrito Swan DO   Specific Practitioner Orders: Eval and treat    OT PLAN OF CARE   OT POC based on physician orders, patient diagnosis and results of clinical assessment     Frequency/Duration: 1-2x / week for 5-12 visits.   Certification period From: 3/12/2025 To: 2025    GOALS (Long term same as Short term):  1) Patient will demonstrate good understanding of home program (exercises/activities/diagnosis/prognosis/goals) with good accuracy.   2) Patient to report decreased pain in their affected R upper extremity from 8/10 to 2/10 or less with resistive functional use.   3) Patient to report 100% compliance with their splint wear, care, and precautions if needed.   4) Patient will report ADL functions as Mod I/I using BUE's.   5) Patient will demonstrate improved functional activity tolerance from fair- to good for ADL/IADL completion.  6) Patient will decrease QuickDASH score to 20% or less for increased participation in daily functional activities.     TODAY'S TREATMENT     Pain Level: 3 on scale of 1-10 at rest and 6/10 with certain motions, sharp and

## 2025-04-09 ENCOUNTER — TREATMENT (OUTPATIENT)
Dept: OCCUPATIONAL THERAPY | Age: 50
End: 2025-04-09
Payer: COMMERCIAL

## 2025-04-09 DIAGNOSIS — G56.03 CARPAL TUNNEL SYNDROME, BILATERAL: Primary | ICD-10-CM

## 2025-04-09 PROCEDURE — 97110 THERAPEUTIC EXERCISES: CPT | Performed by: OCCUPATIONAL THERAPIST

## 2025-04-09 PROCEDURE — 97140 MANUAL THERAPY 1/> REGIONS: CPT | Performed by: OCCUPATIONAL THERAPIST

## 2025-04-09 PROCEDURE — 97035 APP MDLTY 1+ULTRASOUND EA 15: CPT | Performed by: OCCUPATIONAL THERAPIST

## 2025-04-09 NOTE — PROGRESS NOTES
OCCUPATIONAL THERAPY DAILY NOTE  [facility] PHYSICIANS [place] SPECIALTY CARE [facility] OCCUPATIONAL THERAPY  [address]  2[address] FLOOR  [facility] [address]  Dept: [phone]  Loc: [phone]   [facility] OT Fax: [phone]      Date:  2025   Initial Evaluation Date: 3/12/2025                            Evaluating Therapist: Aileen Christy OT     Patient Name:  Josue Dyson                        :  1975     Restrictions/Precautions:  follow appropriate protocol, low fall risk  Diagnosis:  G56.03 (ICD-10-CM) - Bilateral carpal tunnel syndrome                                                            Date of Surgery/Injury: ongoing for a year     Insurance/Certification information:  Med [place]  Plan of care signed (Y/N): N  Visit# / total visits: -     Referring Practitioner:  Pedrito Swan DO   Specific Practitioner Orders: Eval and treat    OT PLAN OF CARE   OT POC based on physician orders, patient diagnosis and results of clinical assessment     Frequency/Duration: 1-2x / week for 5-12 visits.   Certification period From: 3/12/2025 To: 2025    GOALS (Long term same as Short term):  1) Patient will demonstrate good understanding of home program (exercises/activities/diagnosis/prognosis/goals) with good accuracy.   2) Patient to report decreased pain in their affected R upper extremity from 8/10 to 2/10 or less with resistive functional use.   3) Patient to report 100% compliance with their splint wear, care, and precautions if needed.   4) Patient will report ADL functions as Mod I/I using BUE's.   5) Patient will demonstrate improved functional activity tolerance from fair- to good for ADL/IADL completion.  6) Patient will decrease QuickDASH score to 20% or less for increased participation in daily functional activities.     TODAY'S TREATMENT     Pain Level: 4 on scale of 1-10 at rest and 6/10 with certain motions, sharp and

## 2025-04-16 ENCOUNTER — TREATMENT (OUTPATIENT)
Dept: OCCUPATIONAL THERAPY | Age: 50
End: 2025-04-16
Payer: COMMERCIAL

## 2025-04-16 DIAGNOSIS — G56.03 CARPAL TUNNEL SYNDROME, BILATERAL: Primary | ICD-10-CM

## 2025-04-16 PROCEDURE — 97110 THERAPEUTIC EXERCISES: CPT | Performed by: OCCUPATIONAL THERAPIST

## 2025-04-16 PROCEDURE — 97140 MANUAL THERAPY 1/> REGIONS: CPT | Performed by: OCCUPATIONAL THERAPIST

## 2025-04-16 NOTE — PROGRESS NOTES
OCCUPATIONAL THERAPY DAILY NOTE  [facility] PHYSICIANS [place] SPECIALTY CARE [facility] OCCUPATIONAL THERAPY  [address]  2ND FLOOR  [facility] [address]  Dept: [phone]  Loc: [phone]   [facility] OT Fax: [phone]      Date:  2025   Initial Evaluation Date: 3/12/2025                            Evaluating Therapist: Aileen Christy OT     Patient Name:  Josue Dyson                        :  1975     Restrictions/Precautions:  follow appropriate protocol, low fall risk  Diagnosis:  G56.03 (ICD-10-CM) - Bilateral carpal tunnel syndrome                                                            Date of Surgery/Injury: ongoing for a year     Insurance/Certification information:  Med [place]  Plan of care signed (Y/N): N  Visit# / total visits: -     Referring Practitioner:  Pedrito Swan DO   Specific Practitioner Orders: Eval and treat    OT PLAN OF CARE   OT POC based on physician orders, patient diagnosis and results of clinical assessment     Frequency/Duration: 1-2x / week for 5-12 visits.   Certification period From: 3/12/2025 To: 2025    GOALS (Long term same as Short term):  1) Patient will demonstrate good understanding of home program (exercises/activities/diagnosis/prognosis/goals) with good accuracy.   2) Patient to report decreased pain in their affected R upper extremity from 8/10 to 2/10 or less with resistive functional use.   3) Patient to report 100% compliance with their splint wear, care, and precautions if needed.   4) Patient will report ADL functions as Mod I/I using BUE's.   5) Patient will demonstrate improved functional activity tolerance from fair- to good for ADL/IADL completion.  6) Patient will decrease QuickDASH score to 20% or less for increased participation in daily functional activities.     TODAY'S TREATMENT     Pain Level: 3 on scale of 1-10 at rest along back of the hand, sharp and shooting    Subjective: Pt

## 2025-04-23 ENCOUNTER — TREATMENT (OUTPATIENT)
Dept: OCCUPATIONAL THERAPY | Age: 50
End: 2025-04-23
Payer: COMMERCIAL

## 2025-04-23 DIAGNOSIS — G56.03 CARPAL TUNNEL SYNDROME, BILATERAL: Primary | ICD-10-CM

## 2025-04-23 PROCEDURE — 97140 MANUAL THERAPY 1/> REGIONS: CPT | Performed by: OCCUPATIONAL THERAPIST

## 2025-04-23 PROCEDURE — 97110 THERAPEUTIC EXERCISES: CPT | Performed by: OCCUPATIONAL THERAPIST

## 2025-05-15 ENCOUNTER — TREATMENT (OUTPATIENT)
Dept: OCCUPATIONAL THERAPY | Age: 50
End: 2025-05-15
Payer: COMMERCIAL

## 2025-05-15 DIAGNOSIS — G56.03 CARPAL TUNNEL SYNDROME, BILATERAL: Primary | ICD-10-CM

## 2025-05-15 PROCEDURE — 97140 MANUAL THERAPY 1/> REGIONS: CPT | Performed by: OCCUPATIONAL THERAPIST

## 2025-05-15 PROCEDURE — 97110 THERAPEUTIC EXERCISES: CPT | Performed by: OCCUPATIONAL THERAPIST

## 2025-05-15 NOTE — PROGRESS NOTES
OCCUPATIONAL THERAPY DAILY NOTE  [facility] PHYSICIANS [place] SPECIALTY [facility] OCCUPATIONAL THERAPY  [address]  2ND FLOOR  [facility] [address]  Dept: [phone]  Loc: [phone]   [facility] OT Fax: [phone]      Date:  5/15/2025   Initial Evaluation Date: 3/12/2025                            Evaluating Therapist: Aileen Christy OT     Patient Name:  Josue Dyson                        :  1975     Restrictions/Precautions:  follow appropriate protocol, low fall risk  Diagnosis:  G56.03 (ICD-10-CM) - Bilateral carpal tunnel syndrome                                                            Date of Surgery/Injury: ongoing for a year     Insurance/Certification information:  Med [place]  Plan of care signed (Y/N): N  Visit# / total visits: -     Referring Practitioner:  Pedrito Swan DO   Specific Practitioner Orders: Eval and treat    OT PLAN OF CARE   OT POC based on physician orders, patient diagnosis and results of clinical assessment     Frequency/Duration: 1-2x / week for 5-12 visits.   Certification period From: 3/12/2025 To: 2025    GOALS (Long term same as Short term):  1) Patient will demonstrate good understanding of home program (exercises/activities/diagnosis/prognosis/goals) with good accuracy.   2) Patient to report decreased pain in their affected R upper extremity from 8/10 to 2/10 or less with resistive functional use.   3) Patient to report 100% compliance with their splint wear, care, and precautions if needed.   4) Patient will report ADL functions as Mod I/I using BUE's.   5) Patient will demonstrate improved functional activity tolerance from fair- to good for ADL/IADL completion.  6) Patient will decrease QuickDASH score to 20% or less for increased participation in daily functional activities.     TODAY'S TREATMENT     Pain Level: occasional cramping    Subjective: Pt reports improvement with pain in the RUE with pt

## 2025-05-20 ENCOUNTER — TREATMENT (OUTPATIENT)
Dept: OCCUPATIONAL THERAPY | Age: 50
End: 2025-05-20
Payer: COMMERCIAL

## 2025-05-20 DIAGNOSIS — G56.03 CARPAL TUNNEL SYNDROME, BILATERAL: Primary | ICD-10-CM

## 2025-05-20 PROCEDURE — 97110 THERAPEUTIC EXERCISES: CPT | Performed by: OCCUPATIONAL THERAPIST

## 2025-05-20 NOTE — PROGRESS NOTES
Objective:    Updated POC to be completed by 10 th visit.    INTERVENTION: COMPLETED: SPECIFICS/COMMENTS:   Modality:     RUE  MHP 10 mins to decrease stiffness & pain and increase blood flow and soft tissue elasticity.   - US for pain/edema management & increase blood flow and healing at medial elbow  Intensity: 0.8  Duration: 8 mins  Duty cycle: 100%  Depth 3 Mhz        AROM:     RUE x 2 x15 Reverse Gus Twist mod resistance therabar  - towel scrunches x10   - tendon glides x6 + HEP  - True balance x3 mins   - Jux-A-Cizer whole arm 2 mins,  and 2 mins elbow at side to isolate wrist and forearm muscles.         AAROM:     RUE  AAROM ball wrist all planes x15 each, A-Z        PROM/Stretching:     RUE  -x10 each w/ 5 sec holds: elbow at side with wrist flexion fingers pointing away from body, elbow at side with wrist flexion and fingers down towards floor, then same exercises completed with elbows extended   - Wrist flexion/extension x15 mod resistance web        Scar Mass/Edema Control:     RUE x Massage for radial tunnel, extensor retinaculum and  completed to assist with decreasing edema/pain.         Strengthening:     RUE x Isometrics x15 wrist flexion, wrist extension w/ 5 sec holds 3# free weight  - Mod resistance hand exerciser to  graded objects for  strengthening.   - 15# therabar 2x15 sup, pron, twisting, wrist flexion, wrist extension   3.3# x20 theraball tosses on hand, back and forth.        Other:     Compression sleeve x Size F compression sleeve placed on RUE for edema management and comfort. Wear at night and on/off throughout the day.           Assessment/Comments: Pt tolerated session fair with continued focus on strengthening with no increased pain. Pt reports he feels he has improved and plan for D/C next session.     -Rehab Potential: Good   -Patient Response to Treatment: Pt reported decrease pain upon palpation    Patient. Education:  [] Plans/Goals, Risks/Benefits discussed  []

## 2025-05-28 ENCOUNTER — OFFICE VISIT (OUTPATIENT)
Dept: FAMILY MEDICINE CLINIC | Age: 50
End: 2025-05-28
Payer: COMMERCIAL

## 2025-05-28 ENCOUNTER — TREATMENT (OUTPATIENT)
Dept: OCCUPATIONAL THERAPY | Age: 50
End: 2025-05-28
Payer: COMMERCIAL

## 2025-05-28 VITALS
WEIGHT: 204 LBS | SYSTOLIC BLOOD PRESSURE: 114 MMHG | BODY MASS INDEX: 31.02 KG/M2 | HEART RATE: 71 BPM | DIASTOLIC BLOOD PRESSURE: 68 MMHG | TEMPERATURE: 98 F

## 2025-05-28 DIAGNOSIS — E78.5 HYPERLIPIDEMIA, UNSPECIFIED HYPERLIPIDEMIA TYPE: Primary | ICD-10-CM

## 2025-05-28 DIAGNOSIS — G56.03 CARPAL TUNNEL SYNDROME, BILATERAL: Primary | ICD-10-CM

## 2025-05-28 DIAGNOSIS — R73.9 HYPERGLYCEMIA: ICD-10-CM

## 2025-05-28 LAB
ALBUMIN: 4.2 G/DL (ref 3.5–5.2)
ALP BLD-CCNC: 65 U/L (ref 40–129)
ALT SERPL-CCNC: 26 U/L (ref 0–40)
ANION GAP SERPL CALCULATED.3IONS-SCNC: 13 MMOL/L (ref 7–16)
AST SERPL-CCNC: 26 U/L (ref 0–39)
BILIRUB SERPL-MCNC: 0.5 MG/DL (ref 0–1.2)
BUN BLDV-MCNC: 18 MG/DL (ref 6–20)
CALCIUM SERPL-MCNC: 9.2 MG/DL (ref 8.6–10.2)
CHLORIDE BLD-SCNC: 105 MMOL/L (ref 98–107)
CHOLESTEROL, TOTAL: 225 MG/DL
CO2: 22 MMOL/L (ref 22–29)
CREAT SERPL-MCNC: 0.9 MG/DL (ref 0.7–1.2)
GFR, ESTIMATED: >90 ML/MIN/1.73M2
GLUCOSE BLD-MCNC: 75 MG/DL (ref 74–99)
HBA1C MFR BLD: 5.8 % (ref 4–5.6)
HDLC SERPL-MCNC: 58 MG/DL
LDL CHOLESTEROL: 148 MG/DL
POTASSIUM SERPL-SCNC: 4.7 MMOL/L (ref 3.5–5)
SODIUM BLD-SCNC: 140 MMOL/L (ref 132–146)
TOTAL PROTEIN: 7.2 G/DL (ref 6.4–8.3)
TRIGL SERPL-MCNC: 94 MG/DL
VLDLC SERPL CALC-MCNC: 19 MG/DL

## 2025-05-28 PROCEDURE — 1036F TOBACCO NON-USER: CPT | Performed by: FAMILY MEDICINE

## 2025-05-28 PROCEDURE — 97110 THERAPEUTIC EXERCISES: CPT | Performed by: OCCUPATIONAL THERAPIST

## 2025-05-28 PROCEDURE — 99213 OFFICE O/P EST LOW 20 MIN: CPT | Performed by: FAMILY MEDICINE

## 2025-05-28 PROCEDURE — 36415 COLL VENOUS BLD VENIPUNCTURE: CPT | Performed by: FAMILY MEDICINE

## 2025-05-28 PROCEDURE — G8427 DOCREV CUR MEDS BY ELIG CLIN: HCPCS | Performed by: FAMILY MEDICINE

## 2025-05-28 PROCEDURE — 3017F COLORECTAL CA SCREEN DOC REV: CPT | Performed by: FAMILY MEDICINE

## 2025-05-28 PROCEDURE — G8417 CALC BMI ABV UP PARAM F/U: HCPCS | Performed by: FAMILY MEDICINE

## 2025-05-28 NOTE — PROGRESS NOTES
2025    Josue Dyson    Chief Complaint   Patient presents with    Follow-up       HPI  History was obtained from patient.  Josue is a 50 y.o. male who presents today with the followin. Hyperlipidemia, unspecified hyperlipidemia type    2. Hyperglycemia    Patient presents for follow-up of hyperlipidemia and hyperglycemia.   Repeat colonoscopy in 2 years.  Patient had a colonoscopy in November of last year.    REVIEW OF SYMPTOMS    Review of Systems   Constitutional:  Negative for chills, fatigue and fever.   HENT:  Negative for congestion, mouth sores, postnasal drip, rhinorrhea and sinus pain.    Eyes:  Negative for photophobia, discharge and redness.   Respiratory:  Negative for cough and shortness of breath.    Cardiovascular:  Negative for chest pain.   Gastrointestinal: Negative.    Genitourinary:  Negative for difficulty urinating, dysuria, hematuria and urgency.   Neurological:  Negative for headaches.   Psychiatric/Behavioral:  Negative for sleep disturbance.        PAST MEDICAL HISTORY  History reviewed. No pertinent past medical history.    FAMILY HISTORY  History reviewed. No pertinent family history.    SOCIAL HISTORY  Social History     Socioeconomic History    Marital status: Single     Spouse name: None    Number of children: None    Years of education: None    Highest education level: None   Tobacco Use    Smoking status: Former     Types: Cigarettes    Smokeless tobacco: Never   Substance and Sexual Activity    Alcohol use: Yes     Comment: occ     Social Drivers of Health     Food Insecurity: No Food Insecurity (2025)    Hunger Vital Sign     Worried About Running Out of Food in the Last Year: Never true     Ran Out of Food in the Last Year: Never true   Transportation Needs: No Transportation Needs (2025)    PRAPARE - Transportation     Lack of Transportation (Medical): No     Lack of Transportation (Non-Medical): No   Housing Stability: Low Risk  (2025)    Housing

## 2025-05-28 NOTE — PROGRESS NOTES
OCCUPATIONAL THERAPY DAILY NOTE  [facility] PHYSICIANS [place] SPECIALTY CARE [facility] OCCUPATIONAL THERAPY  [address]  2ND FLOOR  [facility] [address]  Dept: [phone]  Loc: [phone]   [facility] OT Fax: [phone]    Date:  2025   Initial Evaluation Date: 3/12/2025                            Evaluating Therapist: Aileen Christy OT     Patient Name:  Josue Dyson                        :  1975     Restrictions/Precautions:  follow appropriate protocol, low fall risk  Diagnosis:  G56.03 (ICD-10-CM) - Bilateral carpal tunnel syndrome                                                            Date of Surgery/Injury: ongoing for a year     Insurance/Certification information:  Med [place]  Plan of care signed (Y/N): N  Visit# / total visits: -     Referring Practitioner:  Pedrito Swan DO   Specific Practitioner Orders: Eval and treat    OT PLAN OF CARE   OT POC based on physician orders, patient diagnosis and results of clinical assessment     Frequency/Duration: 1-2x / week for 5-12 visits.   Certification period From: 3/12/2025 To: 2025    GOALS (Long term same as Short term):  1) Patient will demonstrate good understanding of home program (exercises/activities/diagnosis/prognosis/goals) with good accuracy.   Met, pt reports good understanding of HEP.   2) Patient to report decreased pain in their affected R upper extremity from 8/10 to 2/10 or less with resistive functional use.   Met, Pt reports no pain and only 1-2/10 with certain motions   3) Patient to report 100% compliance with their splint wear, care, and precautions if needed.   Met, Pt reports weaning from splinting with recommendation to start splinting again if pain returns  4) Patient will report ADL functions as Mod I/I using BUE's.   Met, Pt reports independence with BUE's  5) Patient will demonstrate improved functional activity tolerance from fair- to good for ADL/IADL